# Patient Record
Sex: MALE | Race: ASIAN | URBAN - METROPOLITAN AREA
[De-identification: names, ages, dates, MRNs, and addresses within clinical notes are randomized per-mention and may not be internally consistent; named-entity substitution may affect disease eponyms.]

---

## 2018-01-16 ENCOUNTER — RADIANT APPOINTMENT (OUTPATIENT)
Dept: GENERAL RADIOLOGY | Facility: CLINIC | Age: 23
End: 2018-01-16
Attending: FAMILY MEDICINE
Payer: COMMERCIAL

## 2018-01-16 ENCOUNTER — OFFICE VISIT (OUTPATIENT)
Dept: ORTHOPEDICS | Facility: CLINIC | Age: 23
End: 2018-01-16
Payer: COMMERCIAL

## 2018-01-16 VITALS
HEIGHT: 68 IN | SYSTOLIC BLOOD PRESSURE: 164 MMHG | HEART RATE: 88 BPM | DIASTOLIC BLOOD PRESSURE: 95 MMHG | BODY MASS INDEX: 35.84 KG/M2 | WEIGHT: 236.5 LBS

## 2018-01-16 DIAGNOSIS — M79.645 PAIN IN FINGER OF LEFT HAND: ICD-10-CM

## 2018-01-16 DIAGNOSIS — M79.645 PAIN IN FINGER OF LEFT HAND: Primary | ICD-10-CM

## 2018-01-16 NOTE — LETTER
Date:January 17, 2018      Patient was self referred, no letter generated. Do not send.        [facility] Health Information

## 2018-01-16 NOTE — PROGRESS NOTES
"CHIEF COMPLAINT:  Consult (Left Index Finger)       HISTORY OF PRESENT ILLNESS  Mr. Goodman is a pleasant 22 year old year old male who presents to clinic today with a finger issue.  About 4 weeks ago Angelina was playing basketball when he jammed his left index finger.  He noticed that his finger was dislocated at the PIP.  He attempted a reduction and went to the ER.  His finger wasn't fully reduced, there ER reduced his finger fully.  He was discharged with a finger splint.  His finger is still slightly painful and swollen.  Worse with extended use, worse with .  Denies numbness or tingling.      Additional history: as documented    MEDICAL HISTORY  There is no problem list on file for this patient.      No current outpatient prescriptions on file.       Allergies not on file    No family history on file.    Additional medical/Social/Surgical histories reviewed in [facility] and updated as appropriate.     REVIEW OF SYSTEMS (1/16/2018)  CONSTITUTIONAL: Denies fever and weight loss  EYES: Denies acute vision changes  ENT: Denies hearing changes or difficulty swallowing  CARDIAC: Denies chest pain or edema  RESPIRATORY: Denies dyspnea, cough or wheeze  GASTROINTESTINAL: Denies abdominal pain, nausea, vomiting  MUSCULOSKELETAL: See HPI  SKIN: Denies any recent rash or lesion  NEUROLOGICAL: Denies numbness or focal weakness  PSYCHIATRIC: No history of psychiatric symptoms or problems  ENDOCRINE: Denies current diagnosis of diabetes  HEMATOLOGY: Denies episodes of easy bleeding      PHYSICAL EXAM  Vitals:    01/16/18 1727   BP: (!) 164/95   Pulse: 88   Weight: 107.3 kg (236 lb 8 oz)   Height: 1.727 m (5' 8\")     General  - normal appearance, in no obvious distress  CV  - normal radial pulse  Pulm  - normal respiratory pattern, non-labored  Musculoskeletal - left index finger  - inspection: PIP swelling  - palpation: no bony or soft tissue tenderness  - ROM:  MCP 90 deg flexion   0 deg extension    deg flexion   0 deg " extension   DIP 80 deg flexion   0 deg extension  - strength: 5/5  strength, 5/5 wrist abduction, 5/5 flexion, extension, pronation, supination, adduction  - special tests:  (-) varus  (-) valgus  Neuro  - no numbness, no motor deficit, grossly normal coordination, normal muscle tone  Skin  - no ecchymosis, erythema, warmth, or induration, no obvious rash  Psych  - interactive, appropriate, normal mood and affect         ASSESSMENT & PLAN  Mr. Goodman is a 22 year old year old male who presents to clinic today with left index finger pain after a dislocation.    I ordered and reviewed an xray of his hand which shows soft tissue swelling with no fracture.    We jaswinder taped his finger today in clinic.  He should wear this while sleeping and at most times throughout the day for the next couple of weeks.  I did explain to him that this may take an additional 4-6 weeks for the ligaments to fully heal.  If he worsens or has persistent pain over the next couple weeks we may refer him to hand surgery for consult.    It was a pleasure seeing Hiu today.    Trung Negrete DO, CAQSM  Primary Care Sports Medicine

## 2018-01-16 NOTE — LETTER
1/16/2018       RE: Angelina Goodman  [address]  [address]  [place] [address]     Dear Colleague,    Thank you for referring your patient, Angelina Goodman, to the [facility] SPORTS AND ORTHOPAEDIC WALK IN CLINIC at [facility]. Please see a copy of my visit note below.         NEW PATIENT INTAKE QUESTIONNAIRE  SPORTS & ORTHOPEDIC WALK-IN 1/16/2018    Pt was playing basketball and the ball hit his finger. Dislocated at PIP joint. Saw a doctor when it happened. Reduced. Pt states it doesn't feel like it's healing right.     Reason for Visit:    What part of your body is injured / painful?  left index finger    What caused the injury /pain? Recreational/competitive sports injury - Basketball    How long ago did your injury occur or pain begin? about a month ago    What are your most bothersome symptoms? Pain and Swelling    How would you characterize your symptom? aching    What makes your symptoms better? Ice    What makes your symptoms worse? Movement, particularly full flexion    Have you been previously seen for this problem? Yes    Medical History:    Have you had surgery on this body part before? No    Medications: None    Allergies: No known drug allergies    Social History:    Occupation: Student of computer science    Handedness: Left    Exercise: None since injury             CHIEF COMPLAINT:  Consult (Left Index Finger)       HISTORY OF PRESENT ILLNESS  Mr. Goodman is a pleasant 22 year old year old male who presents to clinic today with a finger issue.  About 4 weeks ago Angelina was playing basketball when he jammed his left index finger.  He noticed that his finger was dislocated at the PIP.  He attempted a reduction and went to the ER.  His finger wasn't fully reduced, there ER reduced his finger fully.  He was discharged with a finger splint.  His finger is still slightly painful and swollen.  Worse with extended use, worse with .  Denies numbness or  "tingling.      Additional history: as documented    MEDICAL HISTORY  There is no problem list on file for this patient.      No current outpatient prescriptions on file.       Allergies not on file    No family history on file.    Additional medical/Social/Surgical histories reviewed in [facility] and updated as appropriate.     REVIEW OF SYSTEMS (1/16/2018)  CONSTITUTIONAL: Denies fever and weight loss  EYES: Denies acute vision changes  ENT: Denies hearing changes or difficulty swallowing  CARDIAC: Denies chest pain or edema  RESPIRATORY: Denies dyspnea, cough or wheeze  GASTROINTESTINAL: Denies abdominal pain, nausea, vomiting  MUSCULOSKELETAL: See HPI  SKIN: Denies any recent rash or lesion  NEUROLOGICAL: Denies numbness or focal weakness  PSYCHIATRIC: No history of psychiatric symptoms or problems  ENDOCRINE: Denies current diagnosis of diabetes  HEMATOLOGY: Denies episodes of easy bleeding      PHYSICAL EXAM  Vitals:    01/16/18 1727   BP: (!) 164/95   Pulse: 88   Weight: 107.3 kg (236 lb 8 oz)   Height: 1.727 m (5' 8\")     General  - normal appearance, in no obvious distress  CV  - normal radial pulse  Pulm  - normal respiratory pattern, non-labored  Musculoskeletal - left index finger  - inspection: PIP swelling  - palpation: no bony or soft tissue tenderness  - ROM:  MCP 90 deg flexion   0 deg extension    deg flexion   0 deg extension   DIP 80 deg flexion   0 deg extension  - strength: 5/5  strength, 5/5 wrist abduction, 5/5 flexion, extension, pronation, supination, adduction  - special tests:  (-) varus  (-) valgus  Neuro  - no numbness, no motor deficit, grossly normal coordination, normal muscle tone  Skin  - no ecchymosis, erythema, warmth, or induration, no obvious rash  Psych  - interactive, appropriate, normal mood and affect         ASSESSMENT & PLAN  Mr. Goodman is a 22 year old year old male who presents to clinic today with left index finger pain after a dislocation.    I ordered and " reviewed an xray of his hand which shows soft tissue swelling with no fracture.    We jaswinder taped his finger today in clinic.  He should wear this while sleeping and at most times throughout the day for the next couple of weeks.  I did explain to him that this may take an additional 4-6 weeks for the ligaments to fully heal.  If he worsens or has persistent pain over the next couple weeks we may refer him to hand surgery for consult.    It was a pleasure seeing Hiu today.    Trung Negrete DO, [facility]  Primary Care Sports Medicine      Again, thank you for allowing me to participate in the care of your patient.      Sincerely,    Trung Negrete DO

## 2018-01-16 NOTE — PROGRESS NOTES
NEW PATIENT INTAKE QUESTIONNAIRE  SPORTS & ORTHOPEDIC WALK-IN 1/16/2018    Pt was playing basketball and the ball hit his finger. Dislocated at PIP joint. Saw a doctor when it happened. Reduced. Pt states it doesn't feel like it's healing right.     Reason for Visit:    What part of your body is injured / painful?  left index finger    What caused the injury /pain? Recreational/competitive sports injury - Basketball    How long ago did your injury occur or pain begin? about a month ago    What are your most bothersome symptoms? Pain and Swelling    How would you characterize your symptom? aching    What makes your symptoms better? Ice    What makes your symptoms worse? Movement, particularly full flexion    Have you been previously seen for this problem? Yes    Medical History:    Have you had surgery on this body part before? 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

## 2018-01-16 NOTE — MR AVS SNAPSHOT
"              After Visit Summary   2018    Angelina Goodman    MRN: [id number]           Patient Information     Date Of Birth          1995        Visit Information        Provider Department      2018 5:20 PM Trung Negrete, [facility] Sports [facility] Orthopa[facility] Walk In [facility]        Today's Diagnoses     Pain in finger of left hand    -  1       Follow-ups after your visit        Who to contact     Please call your clinic at [phone] to:    Ask questions about your health    Make or cancel appointments    Discuss your medicines    Learn about your test results    Speak to your doctor   If you have compliments or concerns about an experience at your clinic, or if you wish to file a complaint, please contact [facility] Patient Relations at [phone] or email us at [email or web address]         Additional Information About Your Visit        MyChart Information     [employer] is an electronic gateway that provides easy, online access to your medical records. With [employer], you can request a clinic appointment, read your test results, renew a prescription or communicate with your care team.     To sign up for [employer] visit the website at [email or web address]   You will be asked to enter the access code listed below, as well as some personal information. Please follow the directions to create your username and password.     Your access code is: [email or web address]  Expires: 2018  6:34 PM     Your access code will  in 90 days. If you need help or a new code, please contact your [facility] or call [phone] for assistance.        Care EveryWhere ID     This is your Care EveryWhere ID. This could be used by other organizations to access your [place] medical records  [id number]        Your Vitals Were     Pulse Height BMI (Body Mass Index)             88 1.727 m (5' 8\") 35.96 kg/m2          Blood Pressure from Last 3 Encounters: "   01/16/18 (!) 164/95    Weight from Last 3 Encounters:   01/16/18 107.3 kg (236 lb 8 oz)               Primary Care Provider    None Specified       No primary provider on file.        Equal Access to Services     MARTIR KEANE : Hadii aad ku hadscottjosh Mellissa, wawillada lueh, camposta kacristina westbrook, brittany phandudley yehjames chen tamar hernández. So [facility] [phone].    ATENCIÓN: Si habla español, tiene a frey disposición servicios gratuitos de asistencia lingüística. Llame al [phone].    We comply with applicable federal civil rights laws and [place] laws. We do not discriminate on the basis of race, color, national origin, age, disability, sex, sexual orientation, or gender identity.            Thank you!     Thank you for choosing [facility] SPORTS AND ORTHOPAEDIC WALK IN CLINIC  for your care. Our goal is always to provide you with excellent care. Hearing back from our patients is one way we can continue to improve our services. Please take a few minutes to complete the written survey that you may receive in the mail after your visit with us. Thank you!             Your Updated Medication List - Protect others around you: Learn how to safely use, store and throw away your medicines at www.disposemymeds.org.      Notice  As of 1/16/2018  6:34 PM    You have not been prescribed any medications.